# Patient Record
Sex: MALE | Race: WHITE | Employment: FULL TIME | URBAN - METROPOLITAN AREA
[De-identification: names, ages, dates, MRNs, and addresses within clinical notes are randomized per-mention and may not be internally consistent; named-entity substitution may affect disease eponyms.]

---

## 2022-03-28 ENCOUNTER — HOSPITAL ENCOUNTER (EMERGENCY)
Facility: CLINIC | Age: 37
Discharge: HOME | End: 2022-03-28
Attending: EMERGENCY MEDICINE | Admitting: EMERGENCY MEDICINE

## 2022-03-28 VITALS
TEMPERATURE: 99 F | RESPIRATION RATE: 18 BRPM | SYSTOLIC BLOOD PRESSURE: 125 MMHG | OXYGEN SATURATION: 100 % | DIASTOLIC BLOOD PRESSURE: 69 MMHG | HEART RATE: 83 BPM

## 2022-03-28 DIAGNOSIS — Z01.89 ENCOUNTER FOR LABORATORY TEST: ICD-10-CM

## 2022-03-28 LAB
AMPHETAMINES UR QL SCN: NORMAL
BARBITURATES UR QL: NORMAL
BENZODIAZ UR QL: NORMAL
CANNABINOIDS UR QL SCN: NORMAL
COCAINE UR QL: NORMAL
OPIATES UR QL SCN: NORMAL

## 2022-03-28 PROCEDURE — 80307 DRUG TEST PRSMV CHEM ANLYZR: CPT | Performed by: EMERGENCY MEDICINE

## 2022-03-28 PROCEDURE — 99283 EMERGENCY DEPT VISIT LOW MDM: CPT

## 2022-03-28 NOTE — ED TRIAGE NOTES
Pt reports that he tripped and caught himself at work, pt reports no injuries but that he needs to get cleared for work, pt also reports that he needs a drug test, Pt educated on testing centers. PT VSS and ABC's intact, denies hitting head or LOC with fall

## 2022-03-28 NOTE — ED PROVIDER NOTES
History   Chief Complaint:  Drug screen.     The history is provided by the patient.      Jesse Mccarthy is a 36 year old male who presents for need for drug screen. Patient tripped/stumbled at work. He caught himself and denies any injury. He reports he needs a drug test in order to return to work. He reports his coworkers have been using drugs and his boss wants the patient to get a drug test to ensure he wasn't intoxicated while at work causing the fall. Patient reports he does use prescribed medical cannabis. He works for a construction company. He has no pain/no injuries from his stumble.    Review of Systems   Musculoskeletal:        No joint/neck/back pain   All other systems reviewed and are negative.    Allergies:  The patient has no known allergies.     Medications:  Klonopin  Suboxone  Pamelor  Tenex  Seroquel   Imitrex  Buspar     Past Medical History:     Chronic pain syndrome   Anxiety   PTSD  Pneumothorax     Social History:  Presents to ED alone     Physical Exam     Patient Vitals for the past 24 hrs:   BP Temp Temp src Pulse Resp SpO2   03/28/22 1531 125/69 99  F (37.2  C) Temporal 83 18 100 %       Physical Exam  General: Patient is alert and interactive when I enter the room  Head:  The scalp, face, and head appear normal  Eyes:  Conjunctivae are normal  ENT:    The nose is normal    Pinnae are normal  Neck:  Trachea midline  CV:  Normal rate  Resp:  No respiratory distress   Musc:  Normal muscular tone  Skin:  No rash or lesions noted  Neuro: Speech is normal and fluent. Face is symmetric. Moving all extremities well. No slurred speech.   Psych:  Awake. Alert.  Normal affect.  Appropriate interactions.    Emergency Department Course     Laboratory:  Drug abuse screen 1 urine: Pending      Emergency Department Course:     Reviewed:  I reviewed nursing notes, vitals, past medical history and Care Everywhere    Assessments:  1534 I obtained history and examined the patient as noted above.  Explained findings.     Disposition:  The patient was discharged to home.     Impression & Plan       Medical Decision Making:  Pt presents requesting drug testing for his employer. Educated and discussed with pt extensively that a drug test from the emergency department is often not adequate to exclude job site intoxication and that he should present to a drug testing center to have this done. Pt reports he has already been to 3 places, and he wants our drug test and he vocalizes that he understands that it may not fulfill his employer's requirements. I discussed with him that failure to meet his employer's requirements may result in job termination. He voices understanding, but states he's confident that our test is sufficient. Notably, pt states he expects that his drug test will be positive for cannabinoids, but I note on his MN  that it will likely be positive for opiates and benzodiazepines as well. He was in stable condition at time of discharge. All questions answered.       Diagnosis:    ICD-10-CM    1. Encounter for laboratory test  Z01.89        Scribe Disclosure:  I, Avinash Zavala, am serving as a scribe at 3:33 PM on 3/28/2022 to document services personally performed by Serjio Engle MD based on my observations and the provider's statements to me.            Serjio Engle MD  03/28/22 1847

## 2022-03-28 NOTE — DISCHARGE INSTRUCTIONS
You were seen today for a urine drug screen.    As we discussed, we are not an official drug screening center. While we are able to perform a urine drug screen, we don't follow chain-of-custody procedures.     Please clarify with your employer if a simple hospital urine drug screen satisfies their requirements.   Failure to satisfy their requirements could threaten your employment.